# Patient Record
Sex: FEMALE
[De-identification: names, ages, dates, MRNs, and addresses within clinical notes are randomized per-mention and may not be internally consistent; named-entity substitution may affect disease eponyms.]

---

## 2022-08-25 ENCOUNTER — HOSPITAL ENCOUNTER (EMERGENCY)
Dept: HOSPITAL 5 - ED | Age: 42
Discharge: LEFT BEFORE DISCHARGE | End: 2022-08-25
Payer: MEDICARE

## 2022-08-25 VITALS — DIASTOLIC BLOOD PRESSURE: 90 MMHG | SYSTOLIC BLOOD PRESSURE: 140 MMHG

## 2022-08-25 DIAGNOSIS — M54.50: Primary | ICD-10-CM

## 2022-08-25 DIAGNOSIS — Z53.21: ICD-10-CM

## 2022-08-25 PROCEDURE — 93005 ELECTROCARDIOGRAM TRACING: CPT

## 2022-08-27 NOTE — ELECTROCARDIOGRAPH REPORT
[facility]

                                       

Test Date:    2022               Test Time:    06:14:07

Pat Name:     ZIGGY PADGETT        Department:   

Patient ID:   [id number]          Room:          

Gender:       F                        Technician:   NURSE

:          1980               Requested By: KORTNEY HURLEY

Order Number: [id number]             Reading MD:   Hudson Pike

                                 Measurements

Intervals                              Axis          

Rate:         88                       P:            74

FL:           145                      QRS:          34

QRSD:         64                       T:            42

QT:           367                                    

QTc:          445                                    

                           Interpretive Statements

Sinus rhythm

No previous ECG available for comparison

Electronically Signed On 2022 9:06:12 EDT by Hudson Pike